# Patient Record
Sex: MALE | Race: WHITE | URBAN - METROPOLITAN AREA
[De-identification: names, ages, dates, MRNs, and addresses within clinical notes are randomized per-mention and may not be internally consistent; named-entity substitution may affect disease eponyms.]

---

## 2021-08-11 ENCOUNTER — OFFICE VISIT (OUTPATIENT)
Dept: URBAN - METROPOLITAN AREA CLINIC 107 | Facility: CLINIC | Age: 36
End: 2021-08-11
Payer: COMMERCIAL

## 2021-08-11 ENCOUNTER — WEB ENCOUNTER (OUTPATIENT)
Dept: URBAN - METROPOLITAN AREA CLINIC 107 | Facility: CLINIC | Age: 36
End: 2021-08-11

## 2021-08-11 VITALS
WEIGHT: 216 LBS | BODY MASS INDEX: 30.92 KG/M2 | HEIGHT: 70 IN | HEART RATE: 73 BPM | TEMPERATURE: 98.8 F | DIASTOLIC BLOOD PRESSURE: 78 MMHG | SYSTOLIC BLOOD PRESSURE: 130 MMHG

## 2021-08-11 DIAGNOSIS — K59.01 CONSTIPATION BY DELAYED COLONIC TRANSIT: ICD-10-CM

## 2021-08-11 DIAGNOSIS — R14.0 BLOATING: ICD-10-CM

## 2021-08-11 DIAGNOSIS — R10.84 GENERALIZED ABDOMINAL PAIN: ICD-10-CM

## 2021-08-11 PROCEDURE — 99204 OFFICE O/P NEW MOD 45 MIN: CPT | Performed by: INTERNAL MEDICINE

## 2021-08-11 PROCEDURE — 74177 CT ABD & PELVIS W/CONTRAST: CPT | Performed by: INTERNAL MEDICINE

## 2021-08-11 RX ORDER — SIMETHICONE 80 MG/1
1 TABLET AFTER MEALS AND AT BEDTIME AS NEEDED TABLET, CHEWABLE ORAL
Status: ACTIVE | COMMUNITY

## 2021-08-11 RX ORDER — MAGNESIUM HYDROXIDE 400 MG/5ML
5 ML AT LEAST 4 HOURS BETWEEN DOSES AS NEEDED SUSPENSION, ORAL (FINAL DOSE FORM) ORAL
OUTPATIENT
Start: 2021-08-11

## 2021-08-11 RX ORDER — POLYETHYLENE GLYCOL 3350 17 G/17G
AS DIRECTED POWDER, FOR SOLUTION ORAL
OUTPATIENT
Start: 2021-08-11

## 2021-08-11 RX ORDER — ACETAMINOPHEN,PHENIRAMINE MALEATE, PHENYLEPHRINE HYDROCHLORIDE 650; 20; 10 MG/15000MG; MG/15000MG; MG/15000MG
150 ML POWDER, FOR SUSPENSION ORAL TWICE A DAY
Qty: 9000 | OUTPATIENT
Start: 2021-08-11 | End: 2021-09-10

## 2021-08-11 RX ORDER — TESTOSTERONE 30 MG/1.5ML
1 PUMP TO SKIN IN THE MORNING TO THE UNDERARM SOLUTION TOPICAL ONCE A DAY
Status: ACTIVE | COMMUNITY

## 2021-08-11 RX ORDER — DULOXETINE HYDROCHLORIDE 60 MG/1
1 CAPSULE CAPSULE, DELAYED RELEASE ORAL ONCE A DAY
Status: ACTIVE | COMMUNITY

## 2021-08-11 NOTE — HPI-TODAY'S VISIT:
35-year-old male presenting for evaluation of abdominal pain.  For the past few months, he has been having really bad gas pains that is improved with use of Gas-X. He describes mid abdominal cramping. He is not having bowel movements regularly. He tells me that he has tried probiotics and "all of that kind of stuff" without any improvment in the pain. He takes as many as 4 Gas-X tablets per day. He has bowel movements twice per week. There is significant straining with bowel movements. No nausea or vomiting. He tells me that his mom has a history of abdominal complaints similar to his constipation. No family history of GI disease or malignancy.

## 2021-08-26 LAB
A/G RATIO: 1.7
ALBUMIN: 4.2
ALKALINE PHOSPHATASE: 81
ALT (SGPT): 28
AST (SGOT): 24
BASO (ABSOLUTE): 0.1
BASOS: 1
BILIRUBIN, TOTAL: 0.5
BUN/CREATININE RATIO: 8
BUN: 9
CALCIUM: 9.5
CARBON DIOXIDE, TOTAL: 26
CHLORIDE: 102
CREATININE: 1.12
EGFR IF AFRICN AM: (no result)
EGFR IF NONAFRICN AM: (no result)
EOS (ABSOLUTE): 0
EOS: 1
GLOBULIN, TOTAL: 2.5
GLUCOSE: 94
HEMATOCRIT: 49.1
HEMATOLOGY COMMENTS:: (no result)
HEMOGLOBIN: 16.6
IMMATURE CELLS: (no result)
IMMATURE GRANS (ABS): 0
IMMATURE GRANULOCYTES: 0
LYMPHS (ABSOLUTE): 2.5
LYMPHS: 38
MCH: 28.2
MCHC: 33.8
MCV: 83
MONOCYTES(ABSOLUTE): 0.4
MONOCYTES: 7
NEUTROPHILS (ABSOLUTE): 3.5
NEUTROPHILS: 53
NRBC: (no result)
PLATELETS: 262
POTASSIUM: 4.9
PROTEIN, TOTAL: 6.7
RBC: 5.89
RDW: 12.9
SODIUM: 139
WBC: 6.6

## 2021-09-27 ENCOUNTER — DASHBOARD ENCOUNTERS (OUTPATIENT)
Age: 36
End: 2021-09-27

## 2021-09-27 ENCOUNTER — OFFICE VISIT (OUTPATIENT)
Dept: URBAN - METROPOLITAN AREA CLINIC 107 | Facility: CLINIC | Age: 36
End: 2021-09-27
Payer: COMMERCIAL

## 2021-09-27 VITALS
BODY MASS INDEX: 31.21 KG/M2 | WEIGHT: 218 LBS | TEMPERATURE: 98.5 F | HEIGHT: 70 IN | HEART RATE: 67 BPM | SYSTOLIC BLOOD PRESSURE: 128 MMHG | DIASTOLIC BLOOD PRESSURE: 78 MMHG | RESPIRATION RATE: 18 BRPM

## 2021-09-27 DIAGNOSIS — K59.01 CONSTIPATION BY DELAYED COLONIC TRANSIT: ICD-10-CM

## 2021-09-27 DIAGNOSIS — R14.0 BLOATING: ICD-10-CM

## 2021-09-27 DIAGNOSIS — R10.84 GENERALIZED ABDOMINAL PAIN: ICD-10-CM

## 2021-09-27 PROBLEM — 35298007: Status: ACTIVE | Noted: 2021-08-11

## 2021-09-27 PROCEDURE — 99213 OFFICE O/P EST LOW 20 MIN: CPT | Performed by: INTERNAL MEDICINE

## 2021-09-27 RX ORDER — DULOXETINE HYDROCHLORIDE 60 MG/1
1 CAPSULE CAPSULE, DELAYED RELEASE ORAL ONCE A DAY
Status: ACTIVE | COMMUNITY

## 2021-09-27 RX ORDER — SIMETHICONE 80 MG/1
1 TABLET AFTER MEALS AND AT BEDTIME AS NEEDED TABLET, CHEWABLE ORAL
Status: ACTIVE | COMMUNITY

## 2021-09-27 RX ORDER — TESTOSTERONE 30 MG/1.5ML
1 PUMP TO SKIN IN THE MORNING TO THE UNDERARM SOLUTION TOPICAL ONCE A DAY
Status: ACTIVE | COMMUNITY

## 2021-09-27 RX ORDER — MAGNESIUM HYDROXIDE 400 MG/5ML
5 ML AT LEAST 4 HOURS BETWEEN DOSES AS NEEDED SUSPENSION, ORAL (FINAL DOSE FORM) ORAL
OUTPATIENT

## 2021-09-27 RX ORDER — MAGNESIUM HYDROXIDE 400 MG/5ML
5 ML AT LEAST 4 HOURS BETWEEN DOSES AS NEEDED SUSPENSION, ORAL (FINAL DOSE FORM) ORAL
Status: ACTIVE | COMMUNITY
Start: 2021-08-11

## 2021-09-27 RX ORDER — POLYETHYLENE GLYCOL 3350 17 G/17G
AS DIRECTED POWDER, FOR SOLUTION ORAL
OUTPATIENT

## 2021-09-27 RX ORDER — POLYETHYLENE GLYCOL 3350 17 G/17G
AS DIRECTED POWDER, FOR SOLUTION ORAL
Status: ACTIVE | COMMUNITY
Start: 2021-08-11

## 2021-09-27 NOTE — HPI-TODAY'S VISIT:
46-year-old male with generalized abdominal pain, bloating, gas and cramping in the setting of chronic constipation, likely representing a functional bowel disorder, presenting for follow-up after labs and imaging.  He was last seen in the office on 8/11/2021 for evaluation of his complaints.  He admitted a history of narcotic use and was currently taking Suboxone.  He was recommended a bottle of magnesium citrate for bowel purge followed by a bowel regimen with MiraLAX 17 g daily and milk of magnesia 1 tablespoon every other night as needed. CT of the abdomen and pelvis 8/18/2021 was normal with the exception of constipation.  His bowels are moving daily. He is using MiraLAX daily as well as MOM 1 tbsp daily. Stools are soft formed. No straining. No abdominal pain. No nausea or vomiting. His weight is stable. He is feeling significantly better. No blood per rectum.

## 2022-03-28 ENCOUNTER — OFFICE VISIT (OUTPATIENT)
Dept: URBAN - METROPOLITAN AREA CLINIC 107 | Facility: CLINIC | Age: 37
End: 2022-03-28